# Patient Record
(demographics unavailable — no encounter records)

---

## 2024-10-18 NOTE — PROCEDURE
[de-identified] : Using sterile technique, 2cc of depomedrol 40mg/ml, 4cc of 1% plain lidocaine, and 2 cc 0.25% marcaine was drawn up into a sterile syringe. The left knee was then sterilely prepped with chlorhexidine. Ethyl chloride spray was used to anesthetize the skin and subQ tissue. The depomedrol/lidocaine/marcaine mixture was then injected into the knee joint in the anterolateral position. The patient tolerated the procedure well without difficulty. The patient was given instructions on the use of ice and anti-inflammatories post injection site soreness.   Using sterile technique, 2cc of depomedrol 40mg/ml, 4cc of 1% plain lidocaine, and 2 cc 0.25% marcaine was drawn up into a sterile syringe. The right knee was then sterilely prepped with chlorhexidine. Ethyl chloride spray was used to anesthetize the skin and subQ tissue. The depomedrol/lidocaine/marcaine mixture was then injected into the knee joint in the anterolateral position. The patient tolerated the procedure well without difficulty. The patient was given instructions on the use of ice and anti-inflammatories post injection site soreness.

## 2024-10-18 NOTE — DISCUSSION/SUMMARY
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  Nonoperative treatment options for knee arthritis were discussed including antiinflammatories, physical therapy, intraarticular cortisone injections, and hyaluronic acid gel injections. The patient received a steroid injection and tolerated well. Follow up in 6 weeks.

## 2024-10-18 NOTE — PHYSICAL EXAM
[de-identified] :  The patient appears well nourished and in no apparent distress. The patient is alert and oriented to person, place, and time. Affect and mood appear normal. The head is normocephalic and atraumatic. The eyes reveal normal sclera and extra ocular muscles are intact. The tongue is midline with no apparent lesions. Skin shows normal turgor with no evidence of eczema or psoriasis. No respiratory distress noted. Sensation grossly intact. [de-identified] :  Exam of the left knee shows -20 to 105 degrees of flexion measured with a goniometer.  Exam of the right knee shows -15 to 105 degrees of flexion measured with a goniometer. Healed anterior midline scar. 5/5 motor strength bilaterally distally. Sensation intact distally. [de-identified] : Prior X-ray: 4 views of the right knee demonstrate bone on bone varus arthritis Prior X-ray: 4 views of the left knee demonstrate bone on bone valgus arthritis

## 2024-10-18 NOTE — HISTORY OF PRESENT ILLNESS
[de-identified] :  SANTOSH CUEVAS is a 66 year old male who presents with bilateral knee pain. Hi pain is localized medially. Patient notes the pain is worse with weightbearing activity. He has tried steroid injections in the left knee in June which provided relief. He then had gel injections ending in August without significant relief. He is hopeful for another steroid injection today.

## 2025-01-17 NOTE — HISTORY OF PRESENT ILLNESS
[de-identified] :  SANTOSH CUEVAS is a 67 year old male who presents with bilateral knee pain. Hi pain is localized medially. Patient notes the pain is worse with weightbearing activity. He has tried steroid injections in the bilateral knee in October 2024 which provided relief. He also had gel injections ending in August without significant relief. He is hopeful for another steroid injection today.

## 2025-01-17 NOTE — DISCUSSION/SUMMARY
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  Nonoperative treatment options for knee arthritis were discussed including antiinflammatories, physical therapy, intraarticular cortisone injections, and hyaluronic acid gel injections. The patient received a steroid injection and tolerated well. Follow up in 6 weeks.

## 2025-01-17 NOTE — PHYSICAL EXAM
[de-identified] :  The patient appears well nourished and in no apparent distress. The patient is alert and oriented to person, place, and time. Affect and mood appear normal. The head is normocephalic and atraumatic. The eyes reveal normal sclera and extra ocular muscles are intact. The tongue is midline with no apparent lesions. Skin shows normal turgor with no evidence of eczema or psoriasis. No respiratory distress noted. Sensation grossly intact. [de-identified] :  Exam of the left knee shows -20 to 105 degrees of flexion measured with a goniometer.  Exam of the right knee shows -15 to 105 degrees of flexion measured with a goniometer. Healed anterior midline scar. 5/5 motor strength bilaterally distally. Sensation intact distally. [de-identified] : Prior X-ray: 4 views of the right knee demonstrate bone on bone varus arthritis Prior X-ray: 4 views of the left knee demonstrate bone on bone valgus arthritis

## 2025-01-17 NOTE — PROCEDURE
[de-identified] : Using sterile technique, 2cc of depomedrol 40mg/ml, 4cc of 1% plain lidocaine, and 2 cc 0.25% marcaine was drawn up into a sterile syringe. The left knee was then sterilely prepped with chlorhexidine. Ethyl chloride spray was used to anesthetize the skin and subQ tissue. The depomedrol/lidocaine/marcaine mixture was then injected into the knee joint in the anterolateral position. The patient tolerated the procedure well without difficulty. The patient was given instructions on the use of ice and anti-inflammatories post injection site soreness.   Using sterile technique, 2cc of depomedrol 40mg/ml, 4cc of 1% plain lidocaine, and 2 cc 0.25% marcaine was drawn up into a sterile syringe. The right knee was then sterilely prepped with chlorhexidine. Ethyl chloride spray was used to anesthetize the skin and subQ tissue. The depomedrol/lidocaine/marcaine mixture was then injected into the knee joint in the anterolateral position. The patient tolerated the procedure well without difficulty. The patient was given instructions on the use of ice and anti-inflammatories post injection site soreness.